# Patient Record
Sex: FEMALE | Race: WHITE | HISPANIC OR LATINO | Employment: FULL TIME | URBAN - METROPOLITAN AREA
[De-identification: names, ages, dates, MRNs, and addresses within clinical notes are randomized per-mention and may not be internally consistent; named-entity substitution may affect disease eponyms.]

---

## 2017-06-15 ENCOUNTER — ALLSCRIPTS OFFICE VISIT (OUTPATIENT)
Dept: OTHER | Facility: OTHER | Age: 37
End: 2017-06-15

## 2018-01-13 VITALS
BODY MASS INDEX: 28.28 KG/M2 | RESPIRATION RATE: 16 BRPM | HEIGHT: 67 IN | WEIGHT: 180.19 LBS | OXYGEN SATURATION: 99 % | TEMPERATURE: 99 F | DIASTOLIC BLOOD PRESSURE: 72 MMHG | SYSTOLIC BLOOD PRESSURE: 126 MMHG | HEART RATE: 51 BPM

## 2020-07-06 ENCOUNTER — OFFICE VISIT (OUTPATIENT)
Dept: FAMILY MEDICINE CLINIC | Facility: CLINIC | Age: 40
End: 2020-07-06
Payer: COMMERCIAL

## 2020-07-06 VITALS
WEIGHT: 160.4 LBS | RESPIRATION RATE: 17 BRPM | HEART RATE: 61 BPM | HEIGHT: 67 IN | SYSTOLIC BLOOD PRESSURE: 110 MMHG | TEMPERATURE: 98.7 F | BODY MASS INDEX: 25.18 KG/M2 | OXYGEN SATURATION: 98 % | DIASTOLIC BLOOD PRESSURE: 60 MMHG

## 2020-07-06 DIAGNOSIS — Z12.31 BREAST CANCER SCREENING BY MAMMOGRAM: ICD-10-CM

## 2020-07-06 DIAGNOSIS — S16.1XXA NECK STRAIN, INITIAL ENCOUNTER: Primary | ICD-10-CM

## 2020-07-06 DIAGNOSIS — R20.2 PARESTHESIA OF RIGHT ARM: ICD-10-CM

## 2020-07-06 PROCEDURE — 3008F BODY MASS INDEX DOCD: CPT | Performed by: PHYSICIAN ASSISTANT

## 2020-07-06 PROCEDURE — 99203 OFFICE O/P NEW LOW 30 MIN: CPT | Performed by: PHYSICIAN ASSISTANT

## 2020-07-06 PROCEDURE — 1036F TOBACCO NON-USER: CPT | Performed by: PHYSICIAN ASSISTANT

## 2020-07-06 RX ORDER — METHOCARBAMOL 500 MG/1
500 TABLET, FILM COATED ORAL
Qty: 30 TABLET | Refills: 0 | Status: SHIPPED | OUTPATIENT
Start: 2020-07-06 | End: 2021-09-30 | Stop reason: SDUPTHER

## 2020-07-06 NOTE — PROGRESS NOTES
Assessment/Plan:    1  Neck strain, initial encounter  Likely due to muscle strain  Discussed treatment options  Patient agreeable to ibuprofen 600 mg 3 times daily as needed with food  Robaxin as needed at bedtime  Discussed possible side effects  Patient understood  Also recommended physical therapy  Patient was agreeable  Warm/cool compresses for 15-20 minutes intervals  Stretch daily  Normal activity as tolerated  Call office if symptoms worsen or fail to improve  - Ambulatory referral to Physical Therapy; Future  - methocarbamol (ROBAXIN) 500 mg tablet; Take 1 tablet (500 mg total) by mouth daily at bedtime as needed for muscle spasms  Dispense: 30 tablet; Refill: 0    2  Paresthesia of right arm  See above  - Ambulatory referral to Physical Therapy; Future    3  Breast cancer screening by mammogram  Never had a mammogram   Discussed risk benefits  Patient agreed  - Mammo screening bilateral w 3d & cad; Future      There is no problem list on file for this patient  Subjective:      Patient ID: Юлия Evangelista is a 36 y o  female  Patient is here to establish care  She states she pulled her right neck muscle 6 weeks ago  States since then, the pain has improved, but she is still experiencing numbness and tingling  States her right trap was very tender and tense  Felt like a spasm  After several weeks, she felt better  She has had constant numbness and tingling down her right arm since then  States she can feel her arm isn't can perform fine motor movements normally, but it feels funny  She states it feels funny to hold a pen or Q-tip  Sometimes her arm does feel weak  Denies redness, swelling, or warmth  She does work out 5-6 days a week  No prior neck or shoulder issues  She was taking Motrin and using a heating pad at 1st, but not in a couple weeks  This did help with the pain        The following portions of the patient's history were reviewed and updated as appropriate: allergies, current medications, past family history, past medical history, past social history, past surgical history and problem list     Review of Systems   Constitutional: Negative for chills, fatigue and fever  HENT: Negative for congestion, ear pain, postnasal drip, rhinorrhea and sore throat  Respiratory: Negative for cough, chest tightness, shortness of breath and wheezing  Cardiovascular: Negative for chest pain, palpitations and leg swelling  Gastrointestinal: Negative for abdominal pain, constipation, diarrhea, nausea and vomiting  Musculoskeletal: Positive for arthralgias, myalgias, neck pain and neck stiffness  Negative for back pain, gait problem and joint swelling  Skin: Negative for rash  Neurological: Positive for weakness and numbness  Negative for dizziness, tremors, seizures, syncope, light-headedness and headaches  Hematological: Negative for adenopathy  Objective:      /60 (BP Location: Left arm, Patient Position: Sitting)   Pulse 61   Temp 98 7 °F (37 1 °C) (Tympanic)   Resp 17   Ht 5' 7" (1 702 m)   Wt 72 8 kg (160 lb 6 4 oz)   SpO2 98%   BMI 25 12 kg/m²          Physical Exam   Constitutional: She is oriented to person, place, and time  She appears well-developed and well-nourished  HENT:   Head: Normocephalic and atraumatic  Eyes: Pupils are equal, round, and reactive to light  Conjunctivae are normal    Neck: Normal range of motion  Neck supple  Cardiovascular: Normal rate, regular rhythm, S1 normal, S2 normal, normal heart sounds and intact distal pulses  Pulmonary/Chest: Effort normal and breath sounds normal    Abdominal: Soft  Normal appearance and bowel sounds are normal  She exhibits no mass  There is no hepatosplenomegaly  There is no tenderness  Musculoskeletal: Normal range of motion  Right shoulder: Normal         Left shoulder: Normal         Cervical back: She exhibits spasm (Right trapezius)   She exhibits normal range of motion, no tenderness, no bony tenderness, no swelling, no edema, no deformity, no laceration, no pain and normal pulse  Lymphadenopathy:     She has no cervical adenopathy  Neurological: She is alert and oriented to person, place, and time  She has normal strength and normal reflexes  Skin: Skin is warm, dry and intact  No rash noted  Psychiatric: She has a normal mood and affect  Her behavior is normal  Judgment and thought content normal    Nursing note and vitals reviewed  Current Outpatient Medications on File Prior to Visit   Medication Sig Dispense Refill    fexofenadine (ALLEGRA) 180 MG tablet Take 1 tablet by mouth daily as needed       No current facility-administered medications on file prior to visit

## 2020-07-14 ENCOUNTER — EVALUATION (OUTPATIENT)
Dept: PHYSICAL THERAPY | Facility: CLINIC | Age: 40
End: 2020-07-14
Payer: COMMERCIAL

## 2020-07-14 DIAGNOSIS — S16.1XXA NECK STRAIN, INITIAL ENCOUNTER: ICD-10-CM

## 2020-07-14 DIAGNOSIS — R20.2 PARESTHESIA OF RIGHT ARM: ICD-10-CM

## 2020-07-14 PROCEDURE — 97161 PT EVAL LOW COMPLEX 20 MIN: CPT

## 2020-07-14 PROCEDURE — 97110 THERAPEUTIC EXERCISES: CPT

## 2020-07-14 NOTE — PROGRESS NOTES
PT Evaluation     Today's date: 2020  Patient name: David Benítez  : 1980  MRN: [id number]  Referring provider: Noni Cabot  Dx:   Encounter Diagnosis     ICD-10-CM    1  Neck strain, initial encounter S16  1XXA Ambulatory referral to Physical Therapy   2  Paresthesia of right arm R20 2 Ambulatory referral to Physical Therapy       Start Time:   Stop Time: 0815  Total time in clinic (min): 40 minutes    Assessment  Assessment details: David Benítez is a 36 y o  female who came to outpatient PT on 2020  Patient presents with complaints of neck pain  She also reports constant Numbness down into her thumb and index fingers  No further referral appears necessary at this time based upon examination results  David Benítez has the impairments listed below resulting in functional impairment and are having a negative impact on her quality of life  Patient is appropriate and would benefit from skilled PT intervention to have an optimal return to function and achieve patient specific goals  No Red Flags  All of the patient's questions were answered to their satisfaction and the patient agreed to the plan of care  Patient's greatest impairments are:  1 ) deficits in mobility of cervical / thoracic spine - to be addressed with joint mobilizations and self and therapist stretching   2 ) deficits of soft tissue mobility of R UT and R LS - to be addressed with self and therapist stretching and soft tissue mobilizations  3 ) deficits of motor coordination of deep neck flexors - to be addressed with neuromuscular reeducation, progressive resistance training    4 ) deficits in R Median nerve mobility - to be addressed with nerve mobility exercises  Primary movement impairment diagnosis of  resulting in pathoanatomical symptoms of neck pain with mobility deficits  The encounter diagnoses were Neck strain, initial encounter and Paresthesia of right arm   They are limiting her ability to care for self, exercise or recreation and turn to look over shoulder  Etiologic factors include none recalled by the patient  Impairments: abnormal coordination, abnormal muscle firing, abnormal muscle tone, abnormal or restricted ROM, impaired physical strength, lacks appropriate home exercise program and poor posture     Symptom irritability: moderateUnderstanding of Dx/Px/POC: good   Prognosis: good    Goals  STG (3-4 weeks):  Patient will be able to manage symptoms independently  Patient will report no limitations in all cervical neck motions  Patient will be able to demonstrate proper scapular retraction without UT compensations  LTG (6-8 weeks):  Patient will successfully transition to HEP  - progressing  Patient will improve FOTO score to projected value by the end of treatment  Patient will report no numbness of Thumb and Index fingers  Patient will increase Neck Flexor Endurance time to 35 secs without compensation from anterior throat musculature (ie  SCM)  Patient will report no limitations in exercise  Patient will report no limitations in work related activities  Plan  Patient would benefit from: skilled physical therapy  Referral necessary: No  Planned modality interventions: cryotherapy, thermotherapy: hydrocollator packs and TENS  Other planned modality interventions: as prn  Planned therapy interventions: home exercise program, functional ROM exercises, therapeutic exercise, therapeutic activities, strengthening, stretching, patient education, neuromuscular re-education, motor coordination training, manual therapy and joint mobilization  Frequency: 2x week  Duration in weeks: 8  Treatment plan discussed with: patient        Subjective Evaluation    History of Present Illness  Mechanism of injury: Waldo Clark is a 36 y o  female who came to outpatient PT on 7/14/2020  Patient presents with complaints of neck pain    She also reports constant Numbness down into her thumb and index fingers  Denies clumsiness, weakness, or loss of fine motor skills in hands  The pain began about 6 weeks ago  She woke up with a stiff neck  It was hard to look over her R shoulder or any other neck motions  She then felt relief in her neck about two weeks ago, but got pain down her arm  She still does get shoulder pain, but not as bad as when it first came on  The patient's greatest concerns are  the pain she is experiencing, worry over not knowing what's wrong, fear of not being able to keep active and future ill health (and wanting to prevent it)  Patient is currently taking Motrin for pain  Reports it is not really helping  Patient work:  (a lot of computer work)    Denied any [employer]  Patient finds aggravating factors are:  1 ) pushups  2 ) some cervical movements    Patients finds most relieving factors are:  1 ) massaging, rubbing neck (UT)  2 ) working out (loosens things up)    No further referral appears necessary at this time based upon examination results  Patient's goals for therapy: decrease pain, increase motion, increase strength  Patient's goals: decrease pain, get rid of numbness, wake up without feeling so stiff in her neck   Pain  Current pain ratin  At best pain ratin  At worst pain rating: 10  Location: R UT, R shoulder, numbness into radial wrist and right thumb and index          Objective     Postural Observations  Seated posture: fair  Standing posture: fair    Additional Postural Observation Details  Forward head posture, rounded shoulders  Palpation     Right   Tenderness of the levator scapulae and upper trapezius       Neurological Testing     Sensation   Cervical/Thoracic   Left   Intact: light touch    Right   Intact: light touch    Reflexes   Left   Biceps (C5/C6): normal (2+)  Brachioradialis (C6): normal (2+)  Triceps (C7): normal (2+)    Right   Biceps (C5/C6): normal (2+)  Brachioradialis (C6): normal (2+)  Triceps (C7): normal (2+)    Comments   Right Biceps (C5/C6): unable to relax    Active Range of Motion   Cervical/Thoracic Spine       Cervical    Flexion:  WFL and with pain  Extension:  WFL  Left lateral flexion: Neck active lateral bend left: pain on R      WFL and with pain  Right lateral flexion:  WFL  Left rotation:  [facility] and with pain  Right rotation:  [facility]    Joint Play   Joints within functional limits: C6 and C7     Hypomobile: C3, C4 and C5     Strength/Myotome Testing   Cervical Spine     Left   Interossei strength (t1): 4+    Right   Interossei strength (t1): 4+    Left Shoulder     Planes of Motion   Abduction: 4+     Right Shoulder     Planes of Motion   Abduction: 4+     Left Elbow   Flexion: 4+  Extension: 4    Right Elbow   Flexion: 4+  Extension: 4    Left Wrist/Hand   Wrist extension: 4+  Wrist flexion: 4+  Thumb extension: 4    Right Wrist/Hand   Wrist extension: 4+  Wrist flexion: 4+  Thumb extension: 4    Tests     Additional Tests Details  ULTT Median (+) R    Patient able to perform supine cervical retraction, however she reported pain/stretch therefore did not perform 1" lift             Precautions: N/A      Manuals             SOR & w/ Distraction             Manual Tractoin             STM UT             UT Stretch                                                    Neuro Re-Ed             Supine Chin Tuck             Median Nerve Glide             Scap Squeeze             Low Row                                                    Ther Ex             UBE             UT stretch             Thoracic Ext over 1/2 Bolster                                                                                           Ther Activity                                       Gait Training                                       Modalities             MHP             Traction

## 2020-07-17 ENCOUNTER — OFFICE VISIT (OUTPATIENT)
Dept: PHYSICAL THERAPY | Facility: CLINIC | Age: 40
End: 2020-07-17
Payer: COMMERCIAL

## 2020-07-17 DIAGNOSIS — S16.1XXA NECK STRAIN, INITIAL ENCOUNTER: Primary | ICD-10-CM

## 2020-07-17 DIAGNOSIS — R20.2 PARESTHESIA OF RIGHT ARM: ICD-10-CM

## 2020-07-17 PROCEDURE — 97110 THERAPEUTIC EXERCISES: CPT

## 2020-07-17 PROCEDURE — 97112 NEUROMUSCULAR REEDUCATION: CPT

## 2020-07-17 PROCEDURE — 97140 MANUAL THERAPY 1/> REGIONS: CPT

## 2020-07-17 NOTE — PROGRESS NOTES
Daily Note     Today's date: 2020  Patient name: Anuradha May  : 1980  MRN: [id number]  Referring provider: Delilah Mercado  Dx:   Encounter Diagnosis     ICD-10-CM    1  Neck strain, initial encounter S16  1XXA    2  Paresthesia of right arm R20 2                   Subjective: Patient noted she was very sore after last visit  However, today she feels "ok "      Objective: See treatment diary below      Assessment: Patient presented with soft tissue restrictions of R UT  She noted decreased pain in neck and feeling looser following manual treatment  Reviewed Median nerve glides to eliminate any symptom provocation  She required cueing to perform cervical retraction without compensation  She also required cueing for scap retraction without UT compensation  Patient would benefit from continued PT for an optimal return to function  Plan: Continue per plan of care  Progress treatment as tolerated  Assess response to manual traction NV and trial mechanical traction  Give PiTB for HEP NV for rows and low rows  Precautions: N/A      Manuals             SOR & w/ Distraction             Manual Traction KD  5'            STM & ART to R UT KD  5'            UT Stretch KD  4x30"                                                   Neuro Re-Ed             Supine Chin Tuck 5" 20x            Scap Squeeze 3" 2x10            Low Row 3" 2x10            B/L ER 3"  20x                                                   Ther Ex             UBE nv            UT stretch 4x30" R only            Thoracic Ext over 1/2 Bolster 10x ea              Median Nerve Glide 2x10                                                                             Ther Activity                                       Gait Training                                       Modalities             MHP             Mechanical Traction

## 2020-07-21 ENCOUNTER — APPOINTMENT (OUTPATIENT)
Dept: PHYSICAL THERAPY | Facility: CLINIC | Age: 40
End: 2020-07-21
Payer: COMMERCIAL

## 2020-08-17 NOTE — PROGRESS NOTES
Discharge Note    Patient attended 2 PT sessions from 7/14/2020 - 7/17/2020 for cervical pain  Patient did not return following this visit  I am discharging from my care at this time

## 2020-09-01 ENCOUNTER — OFFICE VISIT (OUTPATIENT)
Dept: FAMILY MEDICINE CLINIC | Facility: CLINIC | Age: 40
End: 2020-09-01
Payer: COMMERCIAL

## 2020-09-01 ENCOUNTER — APPOINTMENT (OUTPATIENT)
Dept: RADIOLOGY | Facility: CLINIC | Age: 40
End: 2020-09-01
Payer: COMMERCIAL

## 2020-09-01 VITALS
HEIGHT: 67 IN | BODY MASS INDEX: 25.11 KG/M2 | SYSTOLIC BLOOD PRESSURE: 108 MMHG | DIASTOLIC BLOOD PRESSURE: 80 MMHG | HEART RATE: 45 BPM | WEIGHT: 160 LBS | TEMPERATURE: 98.9 F | OXYGEN SATURATION: 98 % | RESPIRATION RATE: 16 BRPM

## 2020-09-01 DIAGNOSIS — R20.2 PARESTHESIA OF RIGHT ARM: ICD-10-CM

## 2020-09-01 DIAGNOSIS — M54.2 CERVICALGIA: ICD-10-CM

## 2020-09-01 DIAGNOSIS — R20.2 PARESTHESIA OF RIGHT ARM: Primary | ICD-10-CM

## 2020-09-01 PROCEDURE — 1036F TOBACCO NON-USER: CPT | Performed by: FAMILY MEDICINE

## 2020-09-01 PROCEDURE — 72050 X-RAY EXAM NECK SPINE 4/5VWS: CPT

## 2020-09-01 PROCEDURE — 99214 OFFICE O/P EST MOD 30 MIN: CPT | Performed by: FAMILY MEDICINE

## 2020-09-01 PROCEDURE — 73070 X-RAY EXAM OF ELBOW: CPT

## 2020-09-01 PROCEDURE — 73030 X-RAY EXAM OF SHOULDER: CPT

## 2020-09-01 RX ORDER — GABAPENTIN 100 MG/1
100 CAPSULE ORAL
Qty: 30 CAPSULE | Refills: 0 | Status: SHIPPED | OUTPATIENT
Start: 2020-09-01 | End: 2021-09-30 | Stop reason: ALTCHOICE

## 2020-09-01 NOTE — PROGRESS NOTES
Assessment/Plan:    1  Paresthesia of right arm /Cervicalgia  Reviewed patient's symptoms today  At this time, her symptoms appear most likely secondary to a cervical spine pathology  She was educated on the pathophysiology is problem  At this time, will check x-rays of her cervical spine, shoulder as well as her elbow  She may continue with her muscle relaxer  Will start treatment as well with gabapentin 100 mg q h s  For symptom relief  Will call patient with her x-ray results  - XR spine cervical complete 4 or 5 vw non injury; Future  - XR shoulder 2+ vw right; Future  - XR elbow 2 vw right; Future  - gabapentin (NEURONTIN) 100 mg capsule; Take 1 capsule (100 mg total) by mouth daily at bedtime  Dispense: 30 capsule; Refill: 0               There are no diagnoses linked to this encounter  Subjective:       Chief Complaint   Patient presents with    Follow-up     R arm pain       Patient ID: Bev Orozco is a 36 y o  female  Patient is a 66-year-old female presents today for follow-up on her right arm and neck pain  Since her last visit, she states that she has been to the urgent care multiple times for this problem  She has been on treatment with multiple rounds steroids, muscle relaxers as well as anti-inflammatory medications  She states that on these medications have been effective for her  She still notices pain radiating down her arm  Numbness tingling in hands  She has not taken anything recently for symptoms  Review of Systems   Constitutional: Negative for activity change, chills, fatigue and fever  HENT: Negative for congestion, ear pain, sinus pressure and sore throat  Eyes: Negative for redness, itching and visual disturbance  Respiratory: Negative for cough and shortness of breath  Cardiovascular: Negative for chest pain and palpitations  Gastrointestinal: Negative for abdominal pain, diarrhea and nausea     Endocrine: Negative for cold intolerance and heat intolerance  Genitourinary: Negative for dysuria, flank pain and frequency  Musculoskeletal: Negative for arthralgias, back pain, gait problem and myalgias  Skin: Negative for color change  Allergic/Immunologic: Negative for environmental allergies  Neurological: Negative for dizziness, numbness and headaches  Psychiatric/Behavioral: Negative for behavioral problems and sleep disturbance  The following portions of the patient's history were reviewed and updated as appropriate : past family history, past medical history, past social history and past surgical history  Current Outpatient Medications:     methocarbamol (ROBAXIN) 500 mg tablet, Take 1 tablet (500 mg total) by mouth daily at bedtime as needed for muscle spasms, Disp: 30 tablet, Rfl: 0    fexofenadine (ALLEGRA) 180 MG tablet, Take 1 tablet by mouth daily as needed, Disp: , Rfl:          Objective:       Vitals:    09/01/20 1307   BP: 108/80   BP Location: Left arm   Patient Position: Sitting   Cuff Size: Adult   Pulse: (!) 45   Resp: 16   Temp: 98 9 °F (37 2 °C)   TempSrc: Temporal   SpO2: 98%   Weight: 72 6 kg (160 lb)   Height: 5' 7" (1 702 m)     Physical Exam  Vitals signs reviewed  Constitutional:       Appearance: She is well-developed  HENT:      Head: Normocephalic and atraumatic  Nose: Nose normal       Mouth/Throat:      Pharynx: No oropharyngeal exudate  Eyes:      General: No scleral icterus  Right eye: No discharge  Left eye: No discharge  Pupils: Pupils are equal, round, and reactive to light  Neck:      Musculoskeletal: Normal range of motion and neck supple  Trachea: No tracheal deviation  Cardiovascular:      Rate and Rhythm: Normal rate and regular rhythm  Pulses:           Dorsalis pedis pulses are 2+ on the right side and 2+ on the left side  Posterior tibial pulses are 2+ on the right side and 2+ on the left side  Heart sounds: Normal heart sounds   No murmur  No friction rub  No gallop  Pulmonary:      Effort: Pulmonary effort is normal  No respiratory distress  Breath sounds: Normal breath sounds  No wheezing or rales  Abdominal:      General: Bowel sounds are normal  There is no distension  Palpations: Abdomen is soft  Tenderness: There is no abdominal tenderness  There is no guarding or rebound  Musculoskeletal: Normal range of motion  Lymphadenopathy:      Head:      Right side of head: No submental or submandibular adenopathy  Left side of head: No submental or submandibular adenopathy  Cervical: No cervical adenopathy  Right cervical: No superficial, deep or posterior cervical adenopathy  Left cervical: No superficial, deep or posterior cervical adenopathy  Skin:     General: Skin is warm and dry  Findings: No erythema  Neurological:      Mental Status: She is alert and oriented to person, place, and time  Cranial Nerves: No cranial nerve deficit  Sensory: No sensory deficit  Psychiatric:         Mood and Affect: Mood is not anxious or depressed  Speech: Speech normal          Behavior: Behavior normal          Thought Content:  Thought content normal          Judgment: Judgment normal

## 2020-09-02 DIAGNOSIS — R20.2 PARESTHESIA OF RIGHT ARM: Primary | ICD-10-CM

## 2020-09-02 DIAGNOSIS — M54.2 CERVICALGIA: ICD-10-CM

## 2021-09-30 ENCOUNTER — OFFICE VISIT (OUTPATIENT)
Dept: FAMILY MEDICINE CLINIC | Facility: CLINIC | Age: 41
End: 2021-09-30
Payer: COMMERCIAL

## 2021-09-30 VITALS
SYSTOLIC BLOOD PRESSURE: 104 MMHG | RESPIRATION RATE: 18 BRPM | HEIGHT: 67 IN | OXYGEN SATURATION: 98 % | DIASTOLIC BLOOD PRESSURE: 66 MMHG | TEMPERATURE: 97.5 F | HEART RATE: 65 BPM | WEIGHT: 160 LBS | BODY MASS INDEX: 25.11 KG/M2

## 2021-09-30 DIAGNOSIS — K58.1 IRRITABLE BOWEL SYNDROME WITH CONSTIPATION: ICD-10-CM

## 2021-09-30 DIAGNOSIS — Z12.31 BREAST CANCER SCREENING BY MAMMOGRAM: ICD-10-CM

## 2021-09-30 DIAGNOSIS — M54.2 NECK PAIN: Primary | ICD-10-CM

## 2021-09-30 DIAGNOSIS — M79.601 PAIN IN RIGHT ARM: ICD-10-CM

## 2021-09-30 PROCEDURE — 99213 OFFICE O/P EST LOW 20 MIN: CPT | Performed by: FAMILY MEDICINE

## 2021-09-30 RX ORDER — GABAPENTIN 300 MG/1
300 CAPSULE ORAL DAILY
Qty: 15 CAPSULE | Refills: 0 | Status: SHIPPED | OUTPATIENT
Start: 2021-09-30 | End: 2022-04-08 | Stop reason: ALTCHOICE

## 2021-09-30 RX ORDER — PREDNISONE 10 MG/1
TABLET ORAL
Qty: 30 TABLET | Refills: 0 | Status: SHIPPED | OUTPATIENT
Start: 2021-09-30 | End: 2022-04-08 | Stop reason: ALTCHOICE

## 2021-09-30 RX ORDER — METHYLPREDNISOLONE 4 MG/1
TABLET ORAL
COMMUNITY
Start: 2021-09-20 | End: 2021-09-30 | Stop reason: ALTCHOICE

## 2021-09-30 RX ORDER — OXYCODONE HYDROCHLORIDE AND ACETAMINOPHEN 5; 325 MG/1; MG/1
TABLET ORAL
COMMUNITY
Start: 2021-09-18 | End: 2021-09-30 | Stop reason: ALTCHOICE

## 2021-09-30 RX ORDER — METHOCARBAMOL 500 MG/1
500 TABLET, FILM COATED ORAL
Qty: 30 TABLET | Refills: 0 | Status: SHIPPED | OUTPATIENT
Start: 2021-09-30 | End: 2022-04-08 | Stop reason: ALTCHOICE

## 2021-09-30 RX ORDER — GABAPENTIN 300 MG/1
CAPSULE ORAL
COMMUNITY
Start: 2021-09-18 | End: 2021-09-30 | Stop reason: SDUPTHER

## 2022-04-08 ENCOUNTER — OFFICE VISIT (OUTPATIENT)
Dept: FAMILY MEDICINE CLINIC | Facility: CLINIC | Age: 42
End: 2022-04-08
Payer: COMMERCIAL

## 2022-04-08 VITALS
HEIGHT: 67 IN | WEIGHT: 159.4 LBS | HEART RATE: 58 BPM | BODY MASS INDEX: 25.02 KG/M2 | DIASTOLIC BLOOD PRESSURE: 70 MMHG | TEMPERATURE: 97.5 F | OXYGEN SATURATION: 98 % | SYSTOLIC BLOOD PRESSURE: 120 MMHG

## 2022-04-08 DIAGNOSIS — L02.91 ABSCESS: ICD-10-CM

## 2022-04-08 DIAGNOSIS — J06.9 UPPER RESPIRATORY TRACT INFECTION, UNSPECIFIED TYPE: Primary | ICD-10-CM

## 2022-04-08 PROCEDURE — 99213 OFFICE O/P EST LOW 20 MIN: CPT | Performed by: NURSE PRACTITIONER

## 2022-04-08 PROCEDURE — 3008F BODY MASS INDEX DOCD: CPT | Performed by: NURSE PRACTITIONER

## 2022-04-08 PROCEDURE — 1036F TOBACCO NON-USER: CPT | Performed by: NURSE PRACTITIONER

## 2022-04-08 RX ORDER — AZITHROMYCIN 250 MG/1
TABLET, FILM COATED ORAL
Qty: 6 TABLET | Refills: 0 | Status: SHIPPED | OUTPATIENT
Start: 2022-04-08 | End: 2022-04-08 | Stop reason: ALTCHOICE

## 2022-04-08 RX ORDER — AMOXICILLIN AND CLAVULANATE POTASSIUM 875; 125 MG/1; MG/1
1 TABLET, FILM COATED ORAL EVERY 12 HOURS SCHEDULED
Qty: 20 TABLET | Refills: 0 | Status: SHIPPED | OUTPATIENT
Start: 2022-04-08 | End: 2022-04-18

## 2022-04-08 NOTE — ASSESSMENT & PLAN NOTE
Roughly 1 5 cm round abscess  Hard with center  Tender to touch  Uncertain cause  Treat today with Augmentin as above  Advise warm compress, several times daily   F/U if persistent

## 2022-04-08 NOTE — PROGRESS NOTES
[facility]    ASSESSMENT AND PLAN     1  Upper respiratory tract infection, unspecified type  Assessment & Plan:  Start Augmentin  10 days  Advised OTC cold med, mucinex, Flonase PRN  Probiotic  Well hydrated  F/U if symptoms persist    Orders:  -     amoxicillin-clavulanate (Augmentin) 875-125 mg per tablet; Take 1 tablet by mouth every 12 (twelve) hours for 10 days    2  Abscess  Assessment & Plan:  Roughly 1 5 cm round abscess  Hard with center  Tender to touch  Uncertain cause  Treat today with Augmentin as above  Advise warm compress, several times daily  F/U if persistent    Orders:  -     amoxicillin-clavulanate (Augmentin) 875-125 mg per tablet; Take 1 tablet by mouth every 12 (twelve) hours for 10 days         SUBJECTIVE       Patient ID: Joelle Martinez is a 43 y o  female  Chief Complaint   Patient presents with    Cold Like Symptoms       HISTORY OF PRESENT ILLNESS    Presents with URI symptoms  Started Tuesday:  Sinus pressure/congestion, headache, wet cough with chest tightness  T-max 100°  No GI symptoms  Home COVID negative   sick with similiar        The following portions of the patient's history were reviewed and updated as appropriate: allergies, current medications, past family history, past medical history, past social history, past surgical history, and problem list     REVIEW OF SYSTEMS  Review of Systems   Constitutional: Negative for activity change, appetite change and fatigue  Fever: 100  HENT: Positive for congestion, postnasal drip and sinus pressure  Respiratory: Positive for cough and chest tightness  Negative for shortness of breath and wheezing  Cardiovascular: Negative  Gastrointestinal: Negative  Genitourinary: Negative  Neurological: Positive for headaches  Psychiatric/Behavioral: Negative          OBJECTIVE      VITAL SIGNS  /70 (BP Location: Right arm, Patient Position: Sitting, Cuff Size: Adult)   Pulse 58   Temp 97 5 °F (36 4 °C) (Tympanic)   Ht 5' 7" (1 702 m)   Wt 72 3 kg (159 lb 6 4 oz)   LMP 04/04/2022   SpO2 98%   BMI 24 97 kg/m²     CURRENT MEDICATIONS    Current Outpatient Medications:     fexofenadine (ALLEGRA) 180 MG tablet, Take 1 tablet by mouth daily as needed, Disp: , Rfl:     amoxicillin-clavulanate (Augmentin) 875-125 mg per tablet, Take 1 tablet by mouth every 12 (twelve) hours for 10 days, Disp: 20 tablet, Rfl: 0    linaCLOtide (Linzess) 145 MCG CAPS, Take 1 capsule (145 mcg total) by mouth daily (Patient not taking: Reported on 4/8/2022 ), Disp: 30 capsule, Rfl: 5      PHYSICAL EXAMINATION   Physical Exam  Vitals and nursing note reviewed  Constitutional:       General: She is not in acute distress  Appearance: Normal appearance  She is well-developed and well-groomed  She is not ill-appearing  HENT:      Head: Normocephalic  Right Ear: Tympanic membrane, ear canal and external ear normal       Left Ear: Tympanic membrane, ear canal and external ear normal       Nose: Nose normal       Mouth/Throat:      Pharynx: Oropharynx is clear  Eyes:      Conjunctiva/sclera: Conjunctivae normal       Pupils: Pupils are equal, round, and reactive to light  Cardiovascular:      Rate and Rhythm: Normal rate and regular rhythm  Pulmonary:      Effort: Pulmonary effort is normal  No respiratory distress  Breath sounds: Normal breath sounds and air entry  Musculoskeletal:      Cervical back: Full passive range of motion without pain  Lymphadenopathy:      Cervical: No cervical adenopathy  Skin:     General: Skin is warm and dry  Neurological:      General: No focal deficit present  Mental Status: She is alert and oriented to person, place, and time  Psychiatric:         Attention and Perception: Attention normal          Mood and Affect: Mood normal          Speech: Speech normal          Behavior: Behavior normal          Thought Content:  Thought content normal          Cognition and Memory: Cognition normal          Judgment: Judgment normal

## 2022-06-02 ENCOUNTER — TELEPHONE (OUTPATIENT)
Dept: ADMINISTRATIVE | Facility: OTHER | Age: 42
End: 2022-06-02

## 2022-06-02 ENCOUNTER — OFFICE VISIT (OUTPATIENT)
Dept: FAMILY MEDICINE CLINIC | Facility: CLINIC | Age: 42
End: 2022-06-02
Payer: COMMERCIAL

## 2022-06-02 VITALS
TEMPERATURE: 97.7 F | SYSTOLIC BLOOD PRESSURE: 110 MMHG | BODY MASS INDEX: 24.96 KG/M2 | HEIGHT: 67 IN | WEIGHT: 159 LBS | HEART RATE: 55 BPM | DIASTOLIC BLOOD PRESSURE: 62 MMHG | RESPIRATION RATE: 18 BRPM | OXYGEN SATURATION: 98 %

## 2022-06-02 DIAGNOSIS — R13.10 DYSPHAGIA, UNSPECIFIED TYPE: Primary | ICD-10-CM

## 2022-06-02 DIAGNOSIS — Z11.4 SCREENING FOR HIV (HUMAN IMMUNODEFICIENCY VIRUS): ICD-10-CM

## 2022-06-02 DIAGNOSIS — R49.9 CHANGE IN VOICE: ICD-10-CM

## 2022-06-02 DIAGNOSIS — Z13.220 SCREENING, LIPID: ICD-10-CM

## 2022-06-02 DIAGNOSIS — Z13.0 SCREENING, ANEMIA, DEFICIENCY, IRON: ICD-10-CM

## 2022-06-02 DIAGNOSIS — Z11.59 NEED FOR HEPATITIS C SCREENING TEST: ICD-10-CM

## 2022-06-02 DIAGNOSIS — Z13.1 SCREENING FOR DIABETES MELLITUS: ICD-10-CM

## 2022-06-02 DIAGNOSIS — Z12.31 ENCOUNTER FOR SCREENING MAMMOGRAM FOR BREAST CANCER: ICD-10-CM

## 2022-06-02 DIAGNOSIS — Z13.29 SCREENING FOR THYROID DISORDER: ICD-10-CM

## 2022-06-02 PROCEDURE — 3008F BODY MASS INDEX DOCD: CPT | Performed by: NURSE PRACTITIONER

## 2022-06-02 PROCEDURE — 3725F SCREEN DEPRESSION PERFORMED: CPT | Performed by: NURSE PRACTITIONER

## 2022-06-02 PROCEDURE — 99213 OFFICE O/P EST LOW 20 MIN: CPT | Performed by: NURSE PRACTITIONER

## 2022-06-02 PROCEDURE — 1036F TOBACCO NON-USER: CPT | Performed by: NURSE PRACTITIONER

## 2022-06-02 NOTE — PROGRESS NOTES
[facility]    ASSESSMENT AND PLAN     1  Dysphagia, unspecified type  Assessment & Plan:  Unclear cause of patient's symptoms  Persisting greater than 6 months  Referral today to ENT for evaluation, rule out structural cause  We did discuss anxiety  Notes increase at times  Could be contributing factor  She will follow-up after ENT evaluation  Due for annual physical   Fasting lab work orders placed as below, to complete prior  Orders:  -     Ambulatory Referral to Otolaryngology; Future    2  Change in voice  -     Ambulatory Referral to Otolaryngology; Future    3  Need for hepatitis C screening test  Comments:  Due for annual physical  Fasting lab work orders placed to obtain prior  Orders:  -     Hepatitis C Ab W/Refl To HCV RNA, Qn, PCR; Future; Expected date: 06/02/2022  -     Hepatitis C Ab W/Refl To HCV RNA, Qn, PCR    4  Screening for HIV (human immunodeficiency virus)  -     Human Immunodeficiency Virus 1/2 Antigen / Antibody ( Fourth Generation) with Reflex Testing; Future  -     Human Immunodeficiency Virus 1/2 Antigen / Antibody ( Fourth Generation) with Reflex Testing    5  Encounter for screening mammogram for breast cancer  Comments:  Over due  Encouraged compliance  Orders:  -     Mammo screening bilateral w 3d & cad; Future; Expected date: 06/02/2022    6  Screening, lipid  -     Lipid panel; Future  -     Lipid panel    7  Screening for diabetes mellitus  -     Comprehensive metabolic panel; Future  -     Comprehensive metabolic panel    8  Screening for thyroid disorder  -     TSH, 3rd generation with Free T4 reflex; Future  -     TSH, 3rd generation with Free T4 reflex    9  Screening, anemia, deficiency, iron  -     CBC and differential; Future  -     CBC and differential         SUBJECTIVE       Patient ID: Clive Atkinson is a 43 y o  female      Chief Complaint   Patient presents with    Sore Throat       HISTORY OF PRESENT ILLNESS    Complains today of difficulty swallowing  Occurs randomly, and has noted over last 6 months or so  No pain, just has difficulty performing the swallow  Can swallow food and pills without difficulty  Denies choking and or regurgitation  Feels her throat is dry and scratchy at times  Denies allergies  Has noted a change in her voice-notes it scratchy  States friends and family have noticed change in her voice as well  Denies difficulty or pain with voicing  Would like referral for evaluation        The following portions of the patient's history were reviewed and updated as appropriate: allergies, current medications, past family history, past medical history, past social history, past surgical history, and problem list     REVIEW OF SYSTEMS  Review of Systems   Constitutional: Negative  Negative for activity change, appetite change and fever  HENT: Positive for trouble swallowing and voice change  Negative for congestion and sore throat  Respiratory: Negative  Cardiovascular: Negative  Neurological: Negative  Psychiatric/Behavioral: The patient is nervous/anxious  OBJECTIVE      VITAL SIGNS  /62 (BP Location: Left arm, Patient Position: Sitting)   Pulse 55   Temp 97 7 °F (36 5 °C) (Tympanic)   Resp 18   Ht 5' 6 5" (1 689 m)   Wt 72 1 kg (159 lb)   LMP 05/23/2022   SpO2 98%   BMI 25 28 kg/m²     CURRENT MEDICATIONS    Current Outpatient Medications:     fexofenadine (ALLEGRA) 180 MG tablet, Take 1 tablet by mouth daily as needed, Disp: , Rfl:     linaCLOtide (Linzess) 145 MCG CAPS, Take 1 capsule (145 mcg total) by mouth daily (Patient not taking: No sig reported), Disp: 30 capsule, Rfl: 5      PHYSICAL EXAMINATION   Physical Exam  Vitals and nursing note reviewed  Constitutional:       General: She is not in acute distress  Appearance: Normal appearance  She is well-developed  She is not ill-appearing  HENT:      Head: Normocephalic     Cardiovascular:      Rate and Rhythm: Normal rate and regular rhythm  Pulmonary:      Effort: Pulmonary effort is normal  No respiratory distress  Breath sounds: Normal breath sounds and air entry  Skin:     General: Skin is warm and dry  Neurological:      General: No focal deficit present  Mental Status: She is alert and oriented to person, place, and time  Psychiatric:         Attention and Perception: Attention normal          Mood and Affect: Mood normal          Speech: Speech normal          Behavior: Behavior normal          Thought Content:  Thought content normal          Cognition and Memory: Cognition normal          Judgment: Judgment normal

## 2022-06-02 NOTE — ASSESSMENT & PLAN NOTE
Unclear cause of patient's symptoms  Persisting greater than 6 months  Referral today to ENT for evaluation, rule out structural cause  We did discuss anxiety  Notes increase at times  Could be contributing factor  She will follow-up after ENT evaluation  Due for annual physical   Fasting lab work orders placed as below, to complete prior

## 2022-06-02 NOTE — TELEPHONE ENCOUNTER
----- Message from Pebbles Loyd, Della Haney sent at 6/1/2022  5:10 PM EDT -----  Regarding: Care Gap Request  06/01/22 5:10 PM    Hello, our patient attached above has had Pap Smear (HPV) aka Cervical Cancer Screening completed/performed  Please assist in updating the patient chart by pulling the Care Everywhere (CE) document  The date of service is 8/2019       Thank you,  Pebbles Loyd, MA  [facility]

## 2022-06-02 NOTE — TELEPHONE ENCOUNTER
Upon review of the In Basket request we were able to locate, review, and update the patient chart as requested for Pap Smear (HPV) aka Cervical Cancer Screening  Any additional questions or concerns should be emailed to the Practice Liaisons via [email or web address] email, please do not reply via In Basket      Thank you  Julio Cesar Moya MA

## 2022-06-08 LAB
ALBUMIN SERPL-MCNC: 4.5 G/DL (ref 3.6–5.1)
ALBUMIN/GLOB SERPL: 2 (CALC) (ref 1–2.5)
ALP SERPL-CCNC: 36 U/L (ref 31–125)
ALT SERPL-CCNC: 15 U/L (ref 6–29)
AST SERPL-CCNC: 18 U/L (ref 10–30)
BASOPHILS # BLD AUTO: 20 CELLS/UL (ref 0–200)
BASOPHILS NFR BLD AUTO: 0.6 %
BILIRUB SERPL-MCNC: 0.3 MG/DL (ref 0.2–1.2)
BUN SERPL-MCNC: 15 MG/DL (ref 7–25)
BUN/CREAT SERPL: NORMAL (CALC) (ref 6–22)
CALCIUM SERPL-MCNC: 9.4 MG/DL (ref 8.6–10.2)
CHLORIDE SERPL-SCNC: 106 MMOL/L (ref 98–110)
CHOLEST SERPL-MCNC: 186 MG/DL
CHOLEST/HDLC SERPL: 2.1 (CALC)
CO2 SERPL-SCNC: 26 MMOL/L (ref 20–32)
CREAT SERPL-MCNC: 0.75 MG/DL (ref 0.5–1.1)
EOSINOPHIL # BLD AUTO: 71 CELLS/UL (ref 15–500)
EOSINOPHIL NFR BLD AUTO: 2.1 %
ERYTHROCYTE [DISTWIDTH] IN BLOOD BY AUTOMATED COUNT: 12.9 % (ref 11–15)
GLOBULIN SER CALC-MCNC: 2.3 G/DL (CALC) (ref 1.9–3.7)
GLUCOSE SERPL-MCNC: 82 MG/DL (ref 65–99)
HCT VFR BLD AUTO: 38.9 % (ref 35–45)
HCV AB S/CO SERPL IA: 0.05
HCV AB SERPL QL IA: NORMAL
HDLC SERPL-MCNC: 87 MG/DL
HGB BLD-MCNC: 12.9 G/DL (ref 11.7–15.5)
HIV 1+2 AB+HIV1 P24 AG SERPL QL IA: NORMAL
LDLC SERPL CALC-MCNC: 85 MG/DL (CALC)
LYMPHOCYTES # BLD AUTO: 724 CELLS/UL (ref 850–3900)
LYMPHOCYTES NFR BLD AUTO: 21.3 %
MCH RBC QN AUTO: 31.1 PG (ref 27–33)
MCHC RBC AUTO-ENTMCNC: 33.2 G/DL (ref 32–36)
MCV RBC AUTO: 93.7 FL (ref 80–100)
MONOCYTES # BLD AUTO: 595 CELLS/UL (ref 200–950)
MONOCYTES NFR BLD AUTO: 17.5 %
NEUTROPHILS # BLD AUTO: 1989 CELLS/UL (ref 1500–7800)
NEUTROPHILS NFR BLD AUTO: 58.5 %
NONHDLC SERPL-MCNC: 99 MG/DL (CALC)
PLATELET # BLD AUTO: 163 THOUSAND/UL (ref 140–400)
PMV BLD REES-ECKER: 10.8 FL (ref 7.5–12.5)
POTASSIUM SERPL-SCNC: 4 MMOL/L (ref 3.5–5.3)
PROT SERPL-MCNC: 6.8 G/DL (ref 6.1–8.1)
RBC # BLD AUTO: 4.15 MILLION/UL (ref 3.8–5.1)
SL AMB EGFR AFRICAN AMERICAN: 114 ML/MIN/1.73M2
SL AMB EGFR NON AFRICAN AMERICAN: 98 ML/MIN/1.73M2
SODIUM SERPL-SCNC: 140 MMOL/L (ref 135–146)
TRIGL SERPL-MCNC: 60 MG/DL
TSH SERPL-ACNC: 1.75 MIU/L
WBC # BLD AUTO: 3.4 THOUSAND/UL (ref 3.8–10.8)

## 2023-01-04 ENCOUNTER — OFFICE VISIT (OUTPATIENT)
Dept: FAMILY MEDICINE CLINIC | Facility: CLINIC | Age: 43
End: 2023-01-04

## 2023-01-04 VITALS
RESPIRATION RATE: 14 BRPM | BODY MASS INDEX: 26.9 KG/M2 | OXYGEN SATURATION: 98 % | WEIGHT: 171.4 LBS | TEMPERATURE: 98.2 F | DIASTOLIC BLOOD PRESSURE: 74 MMHG | HEART RATE: 62 BPM | SYSTOLIC BLOOD PRESSURE: 124 MMHG | HEIGHT: 67 IN

## 2023-01-04 DIAGNOSIS — J06.9 VIRAL UPPER RESPIRATORY INFECTION: Primary | ICD-10-CM

## 2023-01-04 DIAGNOSIS — Z12.31 ENCOUNTER FOR SCREENING MAMMOGRAM FOR MALIGNANT NEOPLASM OF BREAST: ICD-10-CM

## 2023-01-04 NOTE — ASSESSMENT & PLAN NOTE
Advised likely viral possibly flu with fever; defers testing given onset of symptoms and COVID19 testing negative which is reasonable; advised on nasal saline irrigation, nasal steroids, and antihistamines; advised on OTC symptom relief and supportive care; f/u guidance given

## 2023-01-04 NOTE — PROGRESS NOTES
Assessment/Plan:     1  Viral upper respiratory infection  Assessment & Plan:  Advised likely viral possibly flu with fever; defers testing given onset of symptoms and COVID19 testing negative which is reasonable; advised on nasal saline irrigation, nasal steroids, and antihistamines; advised on OTC symptom relief and supportive care; f/u guidance given      2  Encounter for screening mammogram for malignant neoplasm of breast  -     Mammo screening bilateral w 3d & cad; Future; Expected date: 01/04/2023        Subjective:      Patient ID: Ayaka Mcgrath is a 43 y o  female  Upper Respiratory Infection  Patient complains of symptoms of a URI, possible sinusitis  Symptoms include bilateral ear pressure/pain, congestion, low grade fever, sinus pressure, sneezing and sore throat  Onset of symptoms was 3 days ago, and has been gradually worsening since that time  Treatment to date: decongestants  [facility] test before coming in and was negative  The following portions of the patient's history were reviewed and updated as appropriate: allergies, current medications, past family history, past medical history, past social history, past surgical history, and problem list     Review of Systems   Constitutional: Positive for chills and fever  HENT: Positive for congestion, ear pain, sinus pain, sneezing and sore throat  Respiratory: Negative for chest tightness, shortness of breath and wheezing  Neurological: Positive for headaches  Objective:      /74 (BP Location: Left arm, Patient Position: Sitting, Cuff Size: Standard)   Pulse 62   Temp 98 2 °F (36 8 °C) (Tympanic)   Resp 14   Ht 5' 7" (1 702 m)   Wt 77 7 kg (171 lb 6 4 oz)   SpO2 98%   BMI 26 85 kg/m²          Physical Exam  Vitals reviewed  Constitutional:       General: She is not in acute distress  Appearance: Normal appearance  She is not ill-appearing, toxic-appearing or diaphoretic     HENT: Head: Normocephalic and atraumatic  Right Ear: A middle ear effusion is present  Tympanic membrane is not erythematous  Left Ear: A middle ear effusion is present  Tympanic membrane is not erythematous  Nose: Congestion and rhinorrhea present  Mouth/Throat:      Pharynx: No oropharyngeal exudate or posterior oropharyngeal erythema  Tonsils: No tonsillar exudate  Eyes:      General: No scleral icterus  Right eye: No discharge  Left eye: No discharge  Conjunctiva/sclera: Conjunctivae normal    Cardiovascular:      Rate and Rhythm: Normal rate and regular rhythm  Pulses: Normal pulses  Heart sounds: Normal heart sounds  No murmur heard  No gallop  Pulmonary:      Effort: Pulmonary effort is normal  No respiratory distress  Breath sounds: Normal breath sounds  No stridor  No wheezing, rhonchi or rales  Musculoskeletal:      Right lower leg: No edema  Left lower leg: No edema  Lymphadenopathy:      Cervical: Cervical adenopathy present  Neurological:      General: No focal deficit present  Mental Status: She is alert and oriented to person, place, and time  Psychiatric:         Mood and Affect: Mood normal          Behavior: Behavior normal          Thought Content:  Thought content normal          Judgment: Judgment normal

## 2023-03-05 PROBLEM — J06.9 VIRAL UPPER RESPIRATORY INFECTION: Status: RESOLVED | Noted: 2022-04-08 | Resolved: 2023-03-05

## 2023-07-18 ENCOUNTER — OFFICE VISIT (OUTPATIENT)
Dept: FAMILY MEDICINE CLINIC | Facility: CLINIC | Age: 43
End: 2023-07-18
Payer: COMMERCIAL

## 2023-07-18 VITALS
DIASTOLIC BLOOD PRESSURE: 64 MMHG | SYSTOLIC BLOOD PRESSURE: 123 MMHG | WEIGHT: 158.2 LBS | HEART RATE: 51 BPM | OXYGEN SATURATION: 92 % | BODY MASS INDEX: 24.83 KG/M2 | TEMPERATURE: 98.5 F | HEIGHT: 67 IN

## 2023-07-18 DIAGNOSIS — R49.9 CHANGE IN VOICE: Primary | ICD-10-CM

## 2023-07-18 DIAGNOSIS — Z72.0 TOBACCO ABUSE: ICD-10-CM

## 2023-07-18 PROCEDURE — 99213 OFFICE O/P EST LOW 20 MIN: CPT | Performed by: FAMILY MEDICINE

## 2023-07-18 NOTE — PROGRESS NOTES
Name: Priscilla Bruner      : 1980      MRN: [id number]  Encounter Provider: Jesus Bridges DO  Encounter Date: 2023   Encounter department: [address] [place] [address]     1. Change in voice  -     Ambulatory Referral to Otolaryngology; Future    2. Tobacco abuse  -     Ambulatory Referral to Otolaryngology; Future           Subjective      Patient presents with a chief complaint of hoarseness which has been present for approximately 18 months. She quit smoking shortly after her symptoms began. She had been a smoker for most of 20 years or more prior to that. She has no cough no postnasal drainage no trouble swallowing no shortness of breath. Review of Systems   Constitutional: Negative. HENT: Positive for voice change. Cardiovascular: Negative. Gastrointestinal: Negative. Genitourinary: Negative. Musculoskeletal: Negative. Psychiatric/Behavioral: Negative. Current Outpatient Medications on File Prior to Visit   Medication Sig   • fexofenadine (ALLEGRA) 180 MG tablet Take 1 tablet by mouth daily as needed   • fluticasone (FLONASE) 50 mcg/act nasal spray SPRAY 2 SPRAYS INTO EACH NOSTRIL EVERY DAY   • linaCLOtide (Linzess) 145 MCG CAPS Take 1 capsule (145 mcg total) by mouth daily       Objective     /64 (BP Location: Left arm, Patient Position: Sitting, Cuff Size: Adult)   Pulse (!) 51   Temp 98.5 °F (36.9 °C)   Ht 5' 7" (1.702 m)   Wt 71.8 kg (158 lb 3.2 oz)   LMP 2023 (Exact Date)   SpO2 92%   BMI 24.78 kg/m²     Physical Exam  HENT:      Head: Normocephalic and atraumatic. Nose: Nasal deformity, septal deviation, congestion and rhinorrhea present. Mouth/Throat:      Lips: Pink. Mouth: Mucous membranes are moist.      Tongue: No lesions. Tongue does not deviate from midline. Palate: No mass and lesions. Pharynx: Oropharynx is clear. No pharyngeal swelling or oropharyngeal exudate.       Tonsils: No tonsillar exudate or tonsillar abscesses. 0 on the right. 0 on the left. Comments: Mild hoarseness with phonation  Neck:      Thyroid: No thyroid mass, thyromegaly or thyroid tenderness. Vascular: Normal carotid pulses. No carotid bruit. Trachea: Trachea normal.   Musculoskeletal:      Cervical back: Full passive range of motion without pain, normal range of motion and neck supple. No erythema or signs of trauma. Normal range of motion. Lymphadenopathy:      Cervical: No cervical adenopathy. Right cervical: No superficial, deep or posterior cervical adenopathy. Left cervical: No superficial, deep or posterior cervical adenopathy.        Jesus Bridges, DO

## 2023-08-09 ENCOUNTER — HOSPITAL ENCOUNTER (OUTPATIENT)
Dept: MAMMOGRAPHY | Facility: MEDICAL CENTER | Age: 43
Discharge: HOME | End: 2023-08-09
Payer: COMMERCIAL

## 2023-08-09 VITALS — WEIGHT: 158.07 LBS | BODY MASS INDEX: 24.81 KG/M2 | HEIGHT: 67 IN

## 2023-08-09 DIAGNOSIS — Z12.31 ENCOUNTER FOR SCREENING MAMMOGRAM FOR MALIGNANT NEOPLASM OF BREAST: ICD-10-CM

## 2023-08-09 PROCEDURE — 77063 BREAST TOMOSYNTHESIS BI: CPT

## 2023-08-09 PROCEDURE — 77067 SCR MAMMO BI INCL CAD: CPT

## 2024-07-19 ENCOUNTER — TELEPHONE (OUTPATIENT)
Age: 44
End: 2024-07-19

## 2024-07-19 NOTE — TELEPHONE ENCOUNTER
Patient called and stated about two years ago she was prescribed a steroid for neck pain. Patient is wondering if she can be prescribed this medication again. Patient advised to make an appointment to be evaluated. Patient stated she would like to come in as soon as possible. Unfortunately at this time I did not see any availability in the providers schedules within the next week. Please review the schedule and return patient call to schedule.

## 2024-07-22 ENCOUNTER — OFFICE VISIT (OUTPATIENT)
Dept: FAMILY MEDICINE CLINIC | Facility: CLINIC | Age: 44
End: 2024-07-22
Payer: COMMERCIAL

## 2024-07-22 VITALS
WEIGHT: 162 LBS | BODY MASS INDEX: 25.37 KG/M2 | TEMPERATURE: 98.4 F | SYSTOLIC BLOOD PRESSURE: 118 MMHG | OXYGEN SATURATION: 100 % | HEART RATE: 60 BPM | DIASTOLIC BLOOD PRESSURE: 80 MMHG

## 2024-07-22 DIAGNOSIS — M54.2 NECK PAIN ON RIGHT SIDE: Primary | ICD-10-CM

## 2024-07-22 DIAGNOSIS — Z13.220 SCREENING, LIPID: ICD-10-CM

## 2024-07-22 DIAGNOSIS — Z13.0 SCREENING, ANEMIA, DEFICIENCY, IRON: ICD-10-CM

## 2024-07-22 DIAGNOSIS — Z13.29 SCREENING FOR THYROID DISORDER: ICD-10-CM

## 2024-07-22 DIAGNOSIS — Z13.1 SCREENING FOR DIABETES MELLITUS: ICD-10-CM

## 2024-07-22 PROCEDURE — 99213 OFFICE O/P EST LOW 20 MIN: CPT | Performed by: NURSE PRACTITIONER

## 2024-07-22 RX ORDER — METHYLPREDNISOLONE 4 MG/1
TABLET ORAL
Qty: 21 EACH | Refills: 0 | Status: SHIPPED | OUTPATIENT
Start: 2024-07-22

## 2024-07-22 NOTE — PROGRESS NOTES
Ambulatory Visit  Name: Hailee Yao      : 1980      MRN: [id number]  Encounter Provider: JOLENE English  Encounter Date: 2024   Encounter department: [facility]    Assessment & Plan   1. Neck pain on right side  Assessment & Plan:  Suspect muscular  Conservative tx reviewed  Rx for Medrol pack  Advise ice/heat; gentle stretching  F/U if changes or worsens  Advise PT is persistent  Orders:  -     methylPREDNISolone 4 MG tablet therapy pack; Use as directed on package  2. Screening for diabetes mellitus  -     Comprehensive metabolic panel; Future  3. Screening, anemia, deficiency, iron  -     CBC and differential; Future  4. Screening for thyroid disorder  -     TSH, 3rd generation with Free T4 reflex; Future  5. Screening, lipid  -     Lipid panel; Future    NOTE: Due for preventative care. Fasting lab work ordered to complete prior.  Pt to schedule once back from vacation     History of Present Illness     Acute visit  Presents with right side neck pain - muscular. Flares every few years. Symptoms started Friday - no injury that she is aware of.   Started hurting on Friday. Extends up neck to head. Has radiated to arm in the last - has not progressed to that yet.  Last treated with Steroid taper. Is leaving for vacation end of week - hoping to heal it and prevent it from worsening prior to her trip.  OTC anti inflammatories have been  ineffective. Muscle relaxes ineffective in the past. Requesting steroid taper today            Review of Systems   Musculoskeletal:  Positive for neck pain (as in HPI). Negative for neck stiffness.       Objective     /80 (BP Location: Left arm, Patient Position: Sitting, Cuff Size: Standard)   Pulse 60   Temp 98.4 °F (36.9 °C) (Temporal)   Wt 73.5 kg (162 lb)   SpO2 100%   BMI 25.37 kg/m²     Physical Exam  Vitals and nursing note reviewed.   Constitutional:       General: She is not in acute distress.     Appearance:  Normal appearance. She is well-developed and well-groomed. She is not ill-appearing.   Musculoskeletal:      Cervical back: Tenderness present. No swelling, edema, erythema, rigidity, spasms or bony tenderness. No pain with movement. Normal range of motion.        Back:    Skin:     General: Skin is warm and dry.   Neurological:      General: No focal deficit present.      Mental Status: She is alert and oriented to person, place, and time.   Psychiatric:         Attention and Perception: Attention normal.         Mood and Affect: Mood normal.         Behavior: Behavior normal.       Administrative Statements

## 2024-07-22 NOTE — ASSESSMENT & PLAN NOTE
Suspect muscular  Conservative tx reviewed  Rx for Medrol pack  Advise ice/heat; gentle stretching  F/U if changes or worsens  Advise PT is persistent

## 2024-09-24 NOTE — ASSESSMENT & PLAN NOTE
Start Augmentin  10 days  Advised OTC cold med, mucinex, Flonase PRN  Probiotic  Well hydrated   F/U if symptoms persist [No Acute Distress] : no acute distress [Normal Sclera/Conjunctiva] : normal sclera/conjunctiva [No Respiratory Distress] : no respiratory distress  [No Joint Swelling] : no joint swelling [Normal] : no rash [Non-distended] : non-distended [No Masses] : no abdominal mass palpated [Normal Bowel Sounds] : normal bowel sounds [de-identified] : underweight [de-identified] : no step off  [de-identified] :  non specific spinal tenderness, mild tenderness over the L scapula, negative drop arm, negative lift off, negative empty can